# Patient Record
Sex: FEMALE | Race: WHITE | NOT HISPANIC OR LATINO
[De-identification: names, ages, dates, MRNs, and addresses within clinical notes are randomized per-mention and may not be internally consistent; named-entity substitution may affect disease eponyms.]

---

## 2019-07-08 PROBLEM — Z00.00 ENCOUNTER FOR PREVENTIVE HEALTH EXAMINATION: Status: ACTIVE | Noted: 2019-07-08

## 2019-08-07 ENCOUNTER — APPOINTMENT (OUTPATIENT)
Dept: OPHTHALMOLOGY | Facility: CLINIC | Age: 59
End: 2019-08-07
Payer: COMMERCIAL

## 2019-08-07 ENCOUNTER — NON-APPOINTMENT (OUTPATIENT)
Age: 59
End: 2019-08-07

## 2019-08-07 PROCEDURE — 92134 CPTRZ OPH DX IMG PST SGM RTA: CPT

## 2019-08-07 PROCEDURE — 92004 COMPRE OPH EXAM NEW PT 1/>: CPT

## 2020-08-31 ENCOUNTER — NON-APPOINTMENT (OUTPATIENT)
Age: 60
End: 2020-08-31

## 2020-08-31 ENCOUNTER — APPOINTMENT (OUTPATIENT)
Dept: OPHTHALMOLOGY | Facility: CLINIC | Age: 60
End: 2020-08-31
Payer: COMMERCIAL

## 2020-08-31 PROCEDURE — 92012 INTRM OPH EXAM EST PATIENT: CPT

## 2020-10-13 ENCOUNTER — RESULT REVIEW (OUTPATIENT)
Age: 60
End: 2020-10-13

## 2020-10-13 ENCOUNTER — APPOINTMENT (OUTPATIENT)
Dept: MAMMOGRAPHY | Facility: IMAGING CENTER | Age: 60
End: 2020-10-13
Payer: COMMERCIAL

## 2020-10-13 ENCOUNTER — OUTPATIENT (OUTPATIENT)
Dept: OUTPATIENT SERVICES | Facility: HOSPITAL | Age: 60
LOS: 1 days | End: 2020-10-13
Payer: COMMERCIAL

## 2020-10-13 DIAGNOSIS — R92.8 OTHER ABNORMAL AND INCONCLUSIVE FINDINGS ON DIAGNOSTIC IMAGING OF BREAST: ICD-10-CM

## 2020-10-13 PROCEDURE — 88305 TISSUE EXAM BY PATHOLOGIST: CPT

## 2020-10-13 PROCEDURE — 88305 TISSUE EXAM BY PATHOLOGIST: CPT | Mod: 26

## 2020-10-13 PROCEDURE — 88360 TUMOR IMMUNOHISTOCHEM/MANUAL: CPT | Mod: 26

## 2020-10-13 PROCEDURE — 19081 BX BREAST 1ST LESION STRTCTC: CPT | Mod: LT

## 2020-10-13 PROCEDURE — 88360 TUMOR IMMUNOHISTOCHEM/MANUAL: CPT

## 2020-10-13 PROCEDURE — A4648: CPT

## 2020-10-13 PROCEDURE — 19081 BX BREAST 1ST LESION STRTCTC: CPT

## 2020-10-13 PROCEDURE — 77065 DX MAMMO INCL CAD UNI: CPT

## 2020-10-13 PROCEDURE — 77065 DX MAMMO INCL CAD UNI: CPT | Mod: 26,LT

## 2020-10-17 ENCOUNTER — TRANSCRIPTION ENCOUNTER (OUTPATIENT)
Age: 60
End: 2020-10-17

## 2020-10-28 ENCOUNTER — APPOINTMENT (OUTPATIENT)
Dept: MRI IMAGING | Facility: CLINIC | Age: 60
End: 2020-10-28
Payer: COMMERCIAL

## 2020-10-28 ENCOUNTER — OUTPATIENT (OUTPATIENT)
Dept: OUTPATIENT SERVICES | Facility: HOSPITAL | Age: 60
LOS: 1 days | End: 2020-10-28
Payer: COMMERCIAL

## 2020-10-28 DIAGNOSIS — Z00.8 ENCOUNTER FOR OTHER GENERAL EXAMINATION: ICD-10-CM

## 2020-10-28 DIAGNOSIS — R92.0 MAMMOGRAPHIC MICROCALCIFICATION FOUND ON DIAGNOSTIC IMAGING OF BREAST: ICD-10-CM

## 2020-10-28 PROCEDURE — C8908: CPT

## 2020-10-28 PROCEDURE — A9585: CPT

## 2020-10-28 PROCEDURE — C8937: CPT

## 2020-10-28 PROCEDURE — 77049 MRI BREAST C-+ W/CAD BI: CPT | Mod: 26

## 2020-11-12 ENCOUNTER — RESULT REVIEW (OUTPATIENT)
Age: 60
End: 2020-11-12

## 2020-11-12 ENCOUNTER — APPOINTMENT (OUTPATIENT)
Dept: MRI IMAGING | Facility: IMAGING CENTER | Age: 60
End: 2020-11-12
Payer: COMMERCIAL

## 2020-11-12 ENCOUNTER — OUTPATIENT (OUTPATIENT)
Dept: OUTPATIENT SERVICES | Facility: HOSPITAL | Age: 60
LOS: 1 days | End: 2020-11-12
Payer: COMMERCIAL

## 2020-11-12 DIAGNOSIS — N63.0 UNSPECIFIED LUMP IN UNSPECIFIED BREAST: ICD-10-CM

## 2020-11-12 PROCEDURE — 19086 BX BREAST ADD LESION MR IMAG: CPT

## 2020-11-12 PROCEDURE — 19086 BX BREAST ADD LESION MR IMAG: CPT | Mod: RT

## 2020-11-12 PROCEDURE — A9585: CPT

## 2020-11-12 PROCEDURE — 19085 BX BREAST 1ST LESION MR IMAG: CPT | Mod: LT

## 2020-11-12 PROCEDURE — 88360 TUMOR IMMUNOHISTOCHEM/MANUAL: CPT | Mod: 26

## 2020-11-12 PROCEDURE — 88360 TUMOR IMMUNOHISTOCHEM/MANUAL: CPT

## 2020-11-12 PROCEDURE — 88305 TISSUE EXAM BY PATHOLOGIST: CPT | Mod: 26

## 2020-11-12 PROCEDURE — 77066 DX MAMMO INCL CAD BI: CPT

## 2020-11-12 PROCEDURE — 88305 TISSUE EXAM BY PATHOLOGIST: CPT

## 2020-11-12 PROCEDURE — 77066 DX MAMMO INCL CAD BI: CPT | Mod: 26

## 2020-11-12 PROCEDURE — 19085 BX BREAST 1ST LESION MR IMAG: CPT

## 2020-11-13 LAB — SURGICAL PATHOLOGY STUDY: SIGNIFICANT CHANGE UP

## 2022-06-23 ENCOUNTER — APPOINTMENT (OUTPATIENT)
Dept: OPHTHALMOLOGY | Facility: CLINIC | Age: 62
End: 2022-06-23

## 2022-06-23 ENCOUNTER — NON-APPOINTMENT (OUTPATIENT)
Age: 62
End: 2022-06-23

## 2022-06-23 PROCEDURE — 92014 COMPRE OPH EXAM EST PT 1/>: CPT

## 2022-06-23 PROCEDURE — 92134 CPTRZ OPH DX IMG PST SGM RTA: CPT

## 2022-12-12 ENCOUNTER — EMERGENCY (EMERGENCY)
Facility: HOSPITAL | Age: 62
LOS: 1 days | Discharge: HOME | End: 2022-12-12
Attending: STUDENT IN AN ORGANIZED HEALTH CARE EDUCATION/TRAINING PROGRAM | Admitting: EMERGENCY MEDICINE
Payer: COMMERCIAL

## 2022-12-12 VITALS
DIASTOLIC BLOOD PRESSURE: 53 MMHG | SYSTOLIC BLOOD PRESSURE: 105 MMHG | OXYGEN SATURATION: 99 % | TEMPERATURE: 98 F | RESPIRATION RATE: 18 BRPM | HEART RATE: 99 BPM

## 2022-12-12 DIAGNOSIS — Z90.13 ACQUIRED ABSENCE OF BILATERAL BREASTS AND NIPPLES: Chronic | ICD-10-CM

## 2022-12-12 LAB
ALBUMIN SERPL ELPH-MCNC: 3.7 G/DL — SIGNIFICANT CHANGE UP (ref 3.3–5)
ALP SERPL-CCNC: 73 U/L — SIGNIFICANT CHANGE UP (ref 40–120)
ALT FLD-CCNC: 5 U/L — SIGNIFICANT CHANGE UP (ref 4–33)
ANION GAP SERPL CALC-SCNC: 10 MMOL/L — SIGNIFICANT CHANGE UP (ref 7–14)
AST SERPL-CCNC: 7 U/L — SIGNIFICANT CHANGE UP (ref 4–32)
BASOPHILS # BLD AUTO: 0.03 K/UL — SIGNIFICANT CHANGE UP (ref 0–0.2)
BASOPHILS NFR BLD AUTO: 0.3 % — SIGNIFICANT CHANGE UP (ref 0–2)
BILIRUB SERPL-MCNC: 0.4 MG/DL — SIGNIFICANT CHANGE UP (ref 0.2–1.2)
BUN SERPL-MCNC: 14 MG/DL — SIGNIFICANT CHANGE UP (ref 7–23)
CALCIUM SERPL-MCNC: 9.7 MG/DL — SIGNIFICANT CHANGE UP (ref 8.4–10.5)
CHLORIDE SERPL-SCNC: 100 MMOL/L — SIGNIFICANT CHANGE UP (ref 98–107)
CO2 SERPL-SCNC: 29 MMOL/L — SIGNIFICANT CHANGE UP (ref 22–31)
CREAT SERPL-MCNC: 0.76 MG/DL — SIGNIFICANT CHANGE UP (ref 0.5–1.3)
EGFR: 88 ML/MIN/1.73M2 — SIGNIFICANT CHANGE UP
EOSINOPHIL # BLD AUTO: 0.01 K/UL — SIGNIFICANT CHANGE UP (ref 0–0.5)
EOSINOPHIL NFR BLD AUTO: 0.1 % — SIGNIFICANT CHANGE UP (ref 0–6)
FLUAV AG NPH QL: SIGNIFICANT CHANGE UP
FLUBV AG NPH QL: SIGNIFICANT CHANGE UP
GLUCOSE SERPL-MCNC: 117 MG/DL — HIGH (ref 70–99)
HCT VFR BLD CALC: 37.4 % — SIGNIFICANT CHANGE UP (ref 34.5–45)
HGB BLD-MCNC: 11.9 G/DL — SIGNIFICANT CHANGE UP (ref 11.5–15.5)
IANC: 7.18 K/UL — SIGNIFICANT CHANGE UP (ref 1.8–7.4)
IMM GRANULOCYTES NFR BLD AUTO: 0.3 % — SIGNIFICANT CHANGE UP (ref 0–0.9)
LYMPHOCYTES # BLD AUTO: 1.03 K/UL — SIGNIFICANT CHANGE UP (ref 1–3.3)
LYMPHOCYTES # BLD AUTO: 11.2 % — LOW (ref 13–44)
MAGNESIUM SERPL-MCNC: 2.2 MG/DL — SIGNIFICANT CHANGE UP (ref 1.6–2.6)
MCHC RBC-ENTMCNC: 27.7 PG — SIGNIFICANT CHANGE UP (ref 27–34)
MCHC RBC-ENTMCNC: 31.8 GM/DL — LOW (ref 32–36)
MCV RBC AUTO: 87 FL — SIGNIFICANT CHANGE UP (ref 80–100)
MONOCYTES # BLD AUTO: 0.93 K/UL — HIGH (ref 0–0.9)
MONOCYTES NFR BLD AUTO: 10.1 % — SIGNIFICANT CHANGE UP (ref 2–14)
NEUTROPHILS # BLD AUTO: 7.18 K/UL — SIGNIFICANT CHANGE UP (ref 1.8–7.4)
NEUTROPHILS NFR BLD AUTO: 78 % — HIGH (ref 43–77)
NRBC # BLD: 0 /100 WBCS — SIGNIFICANT CHANGE UP (ref 0–0)
NRBC # FLD: 0 K/UL — SIGNIFICANT CHANGE UP (ref 0–0)
PHOSPHATE SERPL-MCNC: 3.4 MG/DL — SIGNIFICANT CHANGE UP (ref 2.5–4.5)
PLATELET # BLD AUTO: 327 K/UL — SIGNIFICANT CHANGE UP (ref 150–400)
POTASSIUM SERPL-MCNC: 4.1 MMOL/L — SIGNIFICANT CHANGE UP (ref 3.5–5.3)
POTASSIUM SERPL-SCNC: 4.1 MMOL/L — SIGNIFICANT CHANGE UP (ref 3.5–5.3)
PROT SERPL-MCNC: 7.7 G/DL — SIGNIFICANT CHANGE UP (ref 6–8.3)
RBC # BLD: 4.3 M/UL — SIGNIFICANT CHANGE UP (ref 3.8–5.2)
RBC # FLD: 12.3 % — SIGNIFICANT CHANGE UP (ref 10.3–14.5)
RSV RNA NPH QL NAA+NON-PROBE: SIGNIFICANT CHANGE UP
SARS-COV-2 RNA SPEC QL NAA+PROBE: DETECTED
SODIUM SERPL-SCNC: 139 MMOL/L — SIGNIFICANT CHANGE UP (ref 135–145)
TROPONIN T, HIGH SENSITIVITY RESULT: <6 NG/L — SIGNIFICANT CHANGE UP
WBC # BLD: 9.21 K/UL — SIGNIFICANT CHANGE UP (ref 3.8–10.5)
WBC # FLD AUTO: 9.21 K/UL — SIGNIFICANT CHANGE UP (ref 3.8–10.5)

## 2022-12-12 PROCEDURE — 99220: CPT

## 2022-12-12 PROCEDURE — 93971 EXTREMITY STUDY: CPT | Mod: 26,LT

## 2022-12-12 PROCEDURE — 93010 ELECTROCARDIOGRAM REPORT: CPT

## 2022-12-12 PROCEDURE — 71046 X-RAY EXAM CHEST 2 VIEWS: CPT | Mod: 26

## 2022-12-12 RX ORDER — IBUPROFEN 200 MG
400 TABLET ORAL EVERY 6 HOURS
Refills: 0 | Status: DISCONTINUED | OUTPATIENT
Start: 2022-12-12 | End: 2022-12-15

## 2022-12-12 RX ORDER — IBUPROFEN 200 MG
600 TABLET ORAL EVERY 6 HOURS
Refills: 0 | Status: DISCONTINUED | OUTPATIENT
Start: 2022-12-12 | End: 2022-12-12

## 2022-12-12 RX ORDER — ATORVASTATIN CALCIUM 80 MG/1
40 TABLET, FILM COATED ORAL AT BEDTIME
Refills: 0 | Status: DISCONTINUED | OUTPATIENT
Start: 2022-12-12 | End: 2022-12-15

## 2022-12-12 RX ADMIN — ATORVASTATIN CALCIUM 40 MILLIGRAM(S): 80 TABLET, FILM COATED ORAL at 21:42

## 2022-12-12 RX ADMIN — Medication 400 MILLIGRAM(S): at 19:44

## 2022-12-12 NOTE — ED CDU PROVIDER INITIAL DAY NOTE - CLINICAL SUMMARY MEDICAL DECISION MAKING FREE TEXT BOX
63 yo F with syncope x 2 today. abnormal EKG   labs wnl. LE DVT study negative.   patient placed in CDU for cardiac monitoring, echo and TDD eval tomorrow.    knee pain likely arthritis, unclear. no DVT. no clinical concern for fracture/ dislocation/ or infection. will provide outpatient ortho f/u    Man DUNN: Please see initial ED provider note for further details.  Briefly, plan for echo, cards eval, monitoring, clinical reassessment.

## 2022-12-12 NOTE — ED PROVIDER NOTE - OBJECTIVE STATEMENT
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 Denies fever, chills, chest pain, shortness of breath, difficulty breathing, abdominal pain, N/V/D, urinary symptoms, extremity weakness/numbness/tingling, dizziness, vertigo, blurry vision/change in vision, or headaches. 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 Patient reporting 2 episodes of syncope this morning, reports lightheadedness prior to syncope, no vertigo/dizziness, chest pain/shortness of breath reported prior to syncope. Pt also report. Pt additionally complaining of right-sided Denies fever, chills, chest pain, shortness of breath, difficulty breathing, abdominal pain, N/V/D, urinary symptoms, extremity weakness/numbness/tingling, dizziness, vertigo, blurry vision/change in vision, or headaches. 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 Patient reporting 2 episodes of syncope this morning, reports lightheadedness prior to syncope, no vertigo/dizziness, chest pain/shortness of breath reported prior to syncope. No personal or family history of CAD, CHF, Heart Disease. Pt also reporting right-sided popliteal pain x 1 month, describes the pain as a knee tightening, worse at night, knee pain is relieved with Motrin. Of note, patient was COVID positive on 11/30, treated with Paxlovid. Received flu vaccine. No known ill contacts, no recent travel. Pt has PMD, does not follow Cardiologist. Denies fever, chills, chest pain, dyspnea on exertion, shortness of breath, difficulty breathing, abdominal pain, N/V/D, urinary symptoms, extremity weakness/numbness/tingling, dizziness, vertigo, blurry vision/change in vision, or headaches.

## 2022-12-12 NOTE — ED CDU PROVIDER INITIAL DAY NOTE - PHYSICAL EXAMINATION
Vital signs reviewed.   CONSTITUTIONAL: Well-appearing; well-nourished; in no apparent distress. Non-toxic appearing.   HEAD: Normocephalic, atraumatic.  EYES: PERRL, EOM intact, conjunctiva and sclera WNL.  CARD: Normal S1, S2; no murmurs, rubs, or gallops noted.  RESP: Normal chest excursion with respiration; breath sounds clear and equal bilaterally; no wheezes, rhonchi, or rales.  ABD/GI: soft, non-distended; non-tender; no palpable organomegaly, no pulsatile mass.  EXT/MS: moves all extremities; distal pulses are normal, no pedal edema. RLE: FROM, no effusions, deformity, ligamental laxity or TTP.   SKIN: Normal for age and race; warm; dry; good turgor; no apparent lesions or exudate noted.  NEURO: Awake, alert, oriented x 3, no gross deficits, CN II-XII grossly intact, no motor or sensory deficit noted.

## 2022-12-12 NOTE — ED PROVIDER NOTE - CLINICAL SUMMARY MEDICAL DECISION MAKING FREE TEXT BOX
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 Patient afebrile, hemodynamically stable, spO2 98%. Based on history and physical, differentials include but are not limited to ACS, CHF, Arrhythmia, electrolyte abnormality. Plan to assess patient for acute pathology with Labs, EKG.    Additionally, complaining of right-sided popliteal knee pain x 1 month. Based on history and physical, differentials include but are not limited to Baker's Cyst, DVT. Plan to assess patient for acute pathology with LE Venous Doppler. 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 Patient afebrile, hemodynamically stable, spO2 98%. Based on history and physical, differentials include but are not limited to ACS, CHF, Arrhythmia, electrolyte abnormality. Plan to assess patient for acute pathology with Labs, EKG, CXR. Patient will most likely go to CDU for cardiology workup (ECHO).    Additionally, complaining of right-sided popliteal knee pain x 1 month. Based on history and physical, differentials include but are not limited to Baker's Cyst, DVT. Plan to assess patient for acute pathology with LE Venous Doppler.

## 2022-12-12 NOTE — ED CDU PROVIDER INITIAL DAY NOTE - OBJECTIVE STATEMENT
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 Patient reporting 2 episodes of syncope this morning, reports lightheadedness prior to syncope, no vertigo/dizziness, chest pain/shortness of breath reported prior to syncope. No personal or family history of CAD, CHF, Heart Disease. Pt also reporting right-sided popliteal pain x 1 month, describes the pain as a knee tightening, worse at night, knee pain is relieved with Motrin. Of note, patient was COVID positive on 11/30, treated with Paxlovid. Received flu vaccine. No known ill contacts, no recent travel. Pt has PMD, does not follow Cardiologist. Denies fever, chills, chest pain, dyspnea on exertion, shortness of breath, difficulty breathing, abdominal pain, N/V/D, urinary symptoms, extremity weakness/numbness/tingling, dizziness, vertigo, blurry vision/change in vision, or headaches.    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 Patient reporting 2 episodes of syncope this morning, reports lightheadedness prior to syncope, no vertigo/dizziness, chest pain/shortness of breath reported prior to syncope. No personal or family history of CAD, CHF, Heart Disease. Pt also reporting right-sided popliteal pain x 1 month, describes the pain as a knee tightening, worse at night, knee pain is relieved with Motrin. Of note, patient was COVID positive on 11/30, treated with Paxlovid. Received flu vaccine. No known ill contacts, no recent travel. Pt has PMD, does not follow Cardiologist. Denies fever, chills, chest pain, dyspnea on exertion, shortness of breath, difficulty breathing, abdominal pain, N/V/D, urinary symptoms, extremity weakness/numbness/tingling, dizziness, vertigo, blurry vision/change in vision, or headaches.    CDU COSTA Ann: Agree with above note. 63 yo F non smoker with Past medical history breast cancer ( s/p mastectomy in June 2021 and reconstruction July 2021) and hyperlipidemia here with complaint of lightheadedness and syncope x2 this morning.  Patient with recent COVID infection 11/30 treated with Paxlovid.  Reports no URI symptoms at this time denies congestion, cough, chest pain, shortness of breath, palpitations, fevers, chills, abdominal pain, nausea, vomiting, urinary symptoms, history of VTE, history of CAD, weakness, numbness, tingling, vertigo, headaches.  Patient reports for 1 month she has had right posterior knee pain atraumatic this occurred after receiving the flu vaccination patient unclear if it is related.  Patient reports she is fine when she is up and moving during the day but worse in the morning after waking up the knee feels stiff.  And improves with ibuprofen.  In the ED labs grossly within normal limits troponin less than 6, DVT study of the right lower extremity negative.  Chest x-ray clear lungs.  Patient placed in CDU for cardiac monitoring, echo, telemetry doc of the day evaluation.  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

## 2022-12-12 NOTE — ED PROVIDER NOTE - PHYSICAL EXAMINATION
MSK Lower Extremity: Right-sided popliteal swelling present when compared to the left side.   Bilateral LE extremities with full active/passive ROM, sensation intact, 2+ distal lower pulses, calves are non-tender to palpation, no erythema overlying the skin MSK Lower Extremity: Right-sided popliteal swelling present when compared to the left side.   Bilateral LE extremities with full active/passive ROM, full strength, sensation intact, 2+ distal lower pulses, calves are non-tender to palpation/no palpable cords, no erythema overlying the skin

## 2022-12-12 NOTE — ED PROVIDER NOTE - NS ED ATTENDING STATEMENT MOD
This was a shared visit with the ARA. I reviewed and verified the documentation and independently performed the documented:

## 2022-12-12 NOTE — ED PROVIDER NOTE - PROGRESS NOTE DETAILS
COSTA Gundersno: Reviewed patient lab and imaging results - labs within normal limits, CXR clear, US LE Right with no DVT. Patient accepted by CDU COSTA Ann for Cardiology workup due to new onset RBBB with no established Cardiology care (ECHO).

## 2022-12-12 NOTE — ED ADULT NURSE NOTE - CAS TRG GENERAL AIRWAY, MLM
Patient arrived to ED today with c/o episode of coughing up blood this morning x1.  Patient reports that she has not had this happen before.
Patent

## 2022-12-12 NOTE — ED PROVIDER NOTE - ATTENDING APP SHARED VISIT CONTRIBUTION OF CARE
I have personally performed a face to face medical and diagnostic evaluation of the patient. I have discussed with and reviewed the ARA's note and agree with the History, ROS, Physical Exam and MDM unless otherwise indicated. A brief summary of my personal evaluation and impression can be found below.    Man DUNN: 62-year-old female with a history of DCIS who presented with a chief complaint of syncope x2 earlier this morning.  Patient reports last night had a drink of alcohol thereafter when upon waking this morning had episode of dizziness for which she describes as feeling lightheaded dizzy felt as if things were spinning and passed out second episode occurred in bathroom.  No chest pain no shortness of breath no nausea no vomiting related episodes.  Patient notes since her Flu vaccine that she has been having intermittent body aches as well as a right posterior knee pain that is been there prior to the episode of syncope today.  Patient has never seen a cardiologist.  Patient reports per her primary care doctor her EKG is otherwise normal.  Had an echo about 3 years ago that was normal according to patient.  No prior history of DVT or PE.  No fever. No urinary or bowel complaints.    All other ROS negative, except as above and as per HPI and ROS section.    VITALS: Initial triage and subsequent vitals have been reviewed by me.  GEN APPEARANCE: WDWN, alert, non-toxic, NAD  HEAD: Atraumatic.  EYES: PERRLa, EOMI, vision grossly intact.   NECK: Supple  CV: RRR, S1S2, no c/r/m/g. Cap refill <2 seconds. No bruits.   LUNGS: CTAB. No abnormal breath sounds.  ABDOMEN: Soft, NTND. No guarding or rebound.   MSK/EXT: mild swelling post aspect R knee, No spinal or extremity point tenderness. No CVA ttp. Pelvis stable. No peripheral edema.  NEURO: Alert, follows commands. Weight bearing normal. Speech normal. Sensation and motor normal x4 extremities.   SKIN: Warm, dry and intact. No rash.  PSYCH: Appropriate    Plan/MDM:   Man DUNN: 62-year-old female with a history of DCIS who presented with a chief complaint of syncope x2 earlier this morning.  Patient reports last night had a drink of alcohol thereafter when upon waking this morning had episode of dizziness for which she describes as feeling lightheaded dizzy felt as if things were spinning and passed out second episode occurred in bathroom.  No chest pain no shortness of breath no nausea no vomiting related episodes.  Patient notes since her Flu vaccine that she has been having intermittent body aches as well as a right posterior knee pain that is been there prior to the episode of syncope today.   Exam vital signs stable nontoxic-appearing physical is grossly reassuring.  Suspect episodes of syncope likely vasovagal however, EKG with what appears to be a new right bundle branch block.  Given EKG changes with possible syncope today will check labs chest x-ray cardiac DVT study to right lower extremity for possible Baker's cyst less concern for DVT.  Patient agreeable to CDU stay for echo and cardiac evaluation.

## 2022-12-12 NOTE — ED CDU PROVIDER INITIAL DAY NOTE - MEDICAL DECISION MAKING DETAILS
61 yo F with syncope x 2 today.   labs wnl. LE DVT study negative.   patient placed in CDU for cardiac monitoring, echo and TDD eval tomorrow. 63 yo F with syncope x 2 today. abnormal EKG   labs wnl. LE DVT study negative.   patient placed in CDU for cardiac monitoring, echo and TDD eval tomorrow.    knee pain likely arthritis, unclear. no DVT. no clinical concern for fracture/ dislocation/ or infection. will provide outpatient ortho f/u

## 2022-12-12 NOTE — ED CDU PROVIDER INITIAL DAY NOTE - ATTENDING APP SHARED VISIT CONTRIBUTION OF CARE
I have personally performed a face to face medical and diagnostic evaluation of the patient. I have discussed with and reviewed the ARA's note and agree with the History, ROS, Physical Exam and MDM unless otherwise indicated. A brief summary of my personal evaluation and impression can be found below.    Please see initial ED provider note for further details.

## 2022-12-12 NOTE — ED ADULT NURSE REASSESSMENT NOTE - NS ED NURSE REASSESS COMMENT FT1
Pt remains on the cardiac monitor, is ambulatory to the bathroom, provided a dinner tray. Respirations are even and unlabored, chest rise equal b/l. Denies dizziness at presently. COSTA Smith paged for PRN Motrin, pt says she has been taking it for knee pain at night. Safety being maintained. Will continue to monitor.

## 2022-12-12 NOTE — ED ADULT NURSE NOTE - CHIEF COMPLAINT QUOTE
Patient had two syncopal episodes this morning and behind the right knee stiffness.. (Pt had flu vaccine 11/14 and Covid 11/30)

## 2022-12-12 NOTE — ED ADULT NURSE NOTE - OBJECTIVE STATEMENT
Patient is a 62-year-old female, A&OX3, Phx of high cholesterol, coming in c/o X 2 syncopal episodes. Also reporting pain to back of right knee X few days. Denies Hx of blood clots. Some swelling noted to behind the area. Pt EKG done, and showing R bundle branch block, pt denies any known cardiac Hx. Per pt , witnessed syncopal episode. Says was down for 20 seconds. Was sitting on bed, did not hit head. No AC use. Breathing even and unlabored, chest rise equal b/l. Safety maintained. Will continue to monitor.

## 2022-12-13 VITALS
DIASTOLIC BLOOD PRESSURE: 72 MMHG | SYSTOLIC BLOOD PRESSURE: 104 MMHG | RESPIRATION RATE: 18 BRPM | HEART RATE: 79 BPM | OXYGEN SATURATION: 99 %

## 2022-12-13 PROCEDURE — 99217: CPT

## 2022-12-13 PROCEDURE — 71275 CT ANGIOGRAPHY CHEST: CPT | Mod: 26,MA

## 2022-12-13 PROCEDURE — 93306 TTE W/DOPPLER COMPLETE: CPT | Mod: 26

## 2022-12-13 PROCEDURE — 99284 EMERGENCY DEPT VISIT MOD MDM: CPT

## 2022-12-13 RX ADMIN — Medication 400 MILLIGRAM(S): at 10:11

## 2022-12-13 NOTE — ED CDU PROVIDER SUBSEQUENT DAY NOTE - MEDICAL DECISION MAKING DETAILS
63 yo F with syncope x 2 today. abnormal EKG   labs wnl. LE DVT study negative.   patient placed in CDU for cardiac monitoring, echo and TDD eval tomorrow.

## 2022-12-13 NOTE — ED CDU PROVIDER DISPOSITION NOTE - NSFOLLOWUPINSTRUCTIONS_ED_ALL_ED_FT
Rest, drink plenty of fluids.  Advance activity as tolerated.  Continue all previously prescribed medications as directed.  Follow up with your primary care physician and cardiologist in 48-72 hours- bring copies of your results. Call on referral list given for next available appointment. Return to the ER for worsening or persistent symptoms, and/or ANY NEW OR CONCERNING SYMPTOMS. If you have issues obtaining follow up, please call: [phone] to obtain a doctor or specialist who takes your insurance in your area.

## 2022-12-13 NOTE — CONSULT NOTE ADULT - SUBJECTIVE AND OBJECTIVE BOX
Cardiology/Vascular Medicine Inpatient Consultation Note    HISTORY OF PRESENT ILLNESS:    Patient is a 61 yo woman who has a history of breast cancer (s/p bilateral mastectomy in June 2021 and reconstruction July 2021) and hyperlipidemia.  She presents to [facility] with complaints of lightheadedness and syncope x 2 events.  She has not had these symptoms previously.     12-lead ECG and bedside telemetry --> SR, RBBB, ? ST depressions; otherwise no additional events noted  Cardiac biomarkers flat.  COVID PCR positive  CXR unremarkable for acute findings.  RLE LE venous ultrasound did not note DVT or a Baker's cyst.  CTA chest - no PE    At the time of my evaluation, the patient appeared clinically and hemodynamically stable.  Unremarkable physical exam.  She appeared euvolemic on my exam.    Patient already scheduled for an echocardiogram by the CDU team.    Anticipate with an unremarkable echocardiogram that the patient can be discharged to home without the need for additional inpatient cardiac testing.  If testing is unremarkable, she can f/u with me within 1-2 weeks for further evaluation as needed.    Allergies  No Known Allergies    MEDICATIONS:  ibuprofen  Tablet. 400 milliGRAM(s) Oral every 6 hours PRN  atorvastatin 40 milliGRAM(s) Oral at bedtime    PAST MEDICAL & SURGICAL HISTORY:  Ductal carcinoma in situ (DCIS) of breast  H/O bilateral mastectomy    FAMILY HISTORY:  No pertinent family history in first degree relatives    SOCIAL HISTORY:    As above, as per chart notes    REVIEW OF SYSTEMS:  As above, as per chart notes    PHYSICAL EXAM:  T(C): 36.8 (12-13-22 @ 07:02), Max: 37.4 (12-12-22 @ 20:17)  HR: 79 (12-13-22 @ 11:04) (71 - 104)  BP: 104/72 (12-13-22 @ 11:04) (100/72 - 132/70)  RR: 18 (12-13-22 @ 11:04) (14 - 18)  SpO2: 99% (12-13-22 @ 11:04) (98% - 100%)    Appearance: NAD  HEENT:   No JVD  Cardiovascular: Normal S1 S2  Respiratory: Lungs clear to auscultation	  Psychiatry: Awake, alert  Neurologic: Non-focal  Extremities: No edema      LABS:	 	    CBC Full  -  ( 12 Dec 2022 12:12 )  WBC Count : 9.21 K/uL  Hemoglobin : 11.9 g/dL  Hematocrit : 37.4 %  Platelet Count - Automated : 327 K/uL  Mean Cell Volume : 87.0 fL  Mean Cell Hemoglobin : 27.7 pg  Mean Cell Hemoglobin Concentration : 31.8 gm/dL  Auto Neutrophil # : 7.18 K/uL  Auto Lymphocyte # : 1.03 K/uL  Auto Monocyte # : 0.93 K/uL  Auto Eosinophil # : 0.01 K/uL  Auto Basophil # : 0.03 K/uL  Auto Neutrophil % : 78.0 %  Auto Lymphocyte % : 11.2 %  Auto Monocyte % : 10.1 %  Auto Eosinophil % : 0.1 %  Auto Basophil % : 0.3 %    12-12    139  |  100  |  14  ----------------------------<  117<H>  4.1   |  29  |  0.76    Ca    9.7      12 Dec 2022 12:12  Phos  3.4     12-12  Mg     2.20     12-12    TPro  7.7  /  Alb  3.7  /  TBili  0.4  /  DBili  x   /  AST  7   /  ALT  5   /  AlkPhos  73  12-12    < from: US Duplex Venous Lower Ext Ltd, Right (12.12.22 @ 13:09) >    ACC: [id number] EXAM:  US DPLX LWR EXT VEINS LTD RT                          PROCEDURE DATE:  12/12/2022          INTERPRETATION:  CLINICAL INFORMATION: Lower extremity pain localized to   the popliteal fossa    COMPARISON: None available.    TECHNIQUE: Duplex sonography of the RIGHT LOWER extremity veins with   color and spectral Doppler, with and without compression.    FINDINGS:    There is normal compressibility of the right common femoral, femoral and   popliteal veins.  The contralateral common femoral vein is patent.  Doppler examination shows normal spontaneous and phasic flow.    No calf vein thrombosis is detected.    Ultrasound was directed to the right popliteal region. No Baker's cyst is   identified.    IMPRESSION:  No evidence of right lower extremity deep venous thrombosis.    No right Loza's cyst identified.            --- End of Report ---            ARUN CHOE MD; Attending Radiologist  This document has been electronically signed. Dec 12 2022  1:27PM    < end of copied text >  < from: Xray Chest 2 Views PA/Lat (12.12.22 @ 13:52) >    ACC: [id number] EXAM:  XR CHEST PA LAT 2V                          PROCEDURE DATE:  12/12/2022          INTERPRETATION:  EXAMINATION: XR CHEST PA AND LATERAL    CLINICAL INDICATION: syncope    TECHNIQUE: 2 views; Frontal and lateral views of the chest were obtained.    COMPARISON: None.    FINDINGS:  The cardiomediastinal silhouette is normal in size.  The lungs are clear.  There is no pneumothorax or pleural effusion.  No acute bony abnormality. Mediastinal, bilateral midlung field, left   axillary surgical clips.    IMPRESSION:  Clear lungs.    --- End of Report ---          KAN SANCHEZ MD; Resident Radiologist  This document has been electronically signed.  KAN CRUZ MD; Attending Radiologist  This document has been electronically signed. Dec 12 2022  2:51PM    < end of copied text >  < from: CT Angio Chest PE Protocol w/ IV Cont (12.13.22 @ 10:25) >    ACC: [id number] EXAM:  CT ANGIO CHEST PULM [facility]                          PROCEDURE DATE:  12/13/2022          INTERPRETATION:  CLINICAL INFORMATION: Syncope with Covid infection,   evaluate for pulmonary embolism.    COMPARISON: Chest [id number]/12/2022    CONTRAST/COMPLICATIONS:  IV Contrast: Omnipaque 350  80 cc administered   20 cc discarded  Oral Contrast: NONE  Complications: None reported at time of study completion    PROCEDURE:  CT Angiogram of the chest was obtained with intravenous contrast. Three   dimensional maximum intensity projection (MIP) images were generated.    FINDINGS:    PULMONARY ANGIOGRAM: No pulmonary embolism.    LYMPH NODES: No mediastinal or hilar lymphadenopathy.    HEART/VASCULATURE: The heart is normal in size. No pericardial effusion.   Aortic mild intimal calcifications. The ascending aorta is normal in   caliber.    AIRWAYS/LUNGS/PLEURA: Patent central airways. Left upper lobe calcified 3   mm granuloma. No consolidations, pleural effusion or pneumothorax.    UPPER ABDOMEN: Tiny hiatal hernia.    BONES/SOFT TISSUES: Bilateral breast implants. No aggressive appearing   osseous lesions.    IMPRESSION:    No pulmonary embolism.    --- End of Report ---          ARCHANA CLEVELAND DO; Resident Radiologist  This document has been electronically signed.  PRABHJOT RAYGOZA MD; Attending Radiologist  This document has been electronically signed. Dec 13 2022 12:15PM    < end of copied text >

## 2022-12-13 NOTE — ED CDU PROVIDER SUBSEQUENT DAY NOTE - CLINICAL SUMMARY MEDICAL DECISION MAKING FREE TEXT BOX
61 yo F with syncope x 2 today. abnormal EKG   labs wnl. LE DVT study negative.   patient placed in CDU for cardiac monitoring, echo and TDD eval tomorrow.

## 2022-12-13 NOTE — ED CDU PROVIDER SUBSEQUENT DAY NOTE - HISTORY
61 yo F non smoker with Past medical history breast cancer ( s/p mastectomy in June 2021 and reconstruction July 2021) and hyperlipidemia here with complaint of lightheadedness and syncope x2   In the ED labs grossly within normal limits troponin less than 6, DVT study of the right lower extremity negative.  Chest x-ray clear lungs.  Patient placed in CDU for cardiac monitoring, echo, telemetry doc of the day evaluation.  EKG showing right bundle katiuska block, slight ST depression.

## 2022-12-13 NOTE — ED CDU PROVIDER DISPOSITION NOTE - CLINICAL COURSE
61 yo F with syncope x 2 today. abnormal EKG labs wnl. Lower extremity DVT study negative acute finding.  patient placed in CDU for cardiac monitoring, echo and TDD eval tomorrow. Patient reassessed this morning, sitting comfortably in chair in NAD, denies any complaints. States feeling better, symptoms improved. Pt seen by tele doc, recommending CTA chest to r/o PE. CTA chest negative PE. Echo with mild mitral regurgitation. Discussed with tele doc Dr. Abrams, recommending outpatient follow up. Pt is stable for discharge.

## 2022-12-13 NOTE — CONSULT NOTE ADULT - ASSESSMENT
Patient is a 63 yo woman who has a history of breast cancer (s/p bilateral mastectomy in June 2021 and reconstruction July 2021) and hyperlipidemia.  She presents to [facility] with complaints of lightheadedness and syncope x 2 events.  She has not had these symptoms previously.     12-lead ECG and bedside telemetry --> SR, RBBB, ? ST depressions; otherwise no additional events noted  Cardiac biomarkers flat.  COVID PCR positive  CXR unremarkable for acute findings.  RLE LE venous ultrasound did not note DVT or a Baker's cyst.  CTA chest - no PE    At the time of my evaluation, the patient appeared clinically and hemodynamically stable.  Unremarkable physical exam.  She appeared euvolemic on my exam.    Patient already scheduled for an echocardiogram by the CDU team.    Anticipate with an unremarkable echocardiogram that the patient can be discharged to home without the need for additional inpatient cardiac testing.  If testing is unremarkable, she can f/u with me within 1-2 weeks for further evaluation as needed.

## 2022-12-13 NOTE — ED CDU PROVIDER DISPOSITION NOTE - PATIENT PORTAL LINK FT
You can access the FollowMyHealth Patient Portal offered by [facility] by registering at the following website: [email or web address]. By joining [facility]’s FollowMyHealth portal, you will also be able to view your health information using other applications (apps) compatible with our system.

## 2022-12-13 NOTE — ED CDU PROVIDER DISPOSITION NOTE - CARE PROVIDER_API CALL
Jose Abrams (MD; PhD)  Cardiology; Internal Medicine; Vascular Medicine  [id number] [place] [address], [address]  [place] [address]  Phone: [phone]  Fax: [phone]  Follow Up Time: Urgent

## 2022-12-13 NOTE — ED CDU PROVIDER SUBSEQUENT DAY NOTE - PROGRESS NOTE DETAILS
spoke with echo lab, states pt has been prioritized for echo. Patient reassessed, sitting comfortably in chair in NAD, denies any complaints. States feeling better, symptoms improved. spoke with tele doc, Dr. Abrams, recommending CTA chest to r/o PE. Will continue to monitor and reassess. CTA chest negative for PE. Spoke with Echo lab, will send transport for patient. Will continue to monitor and reassess. echo reviewed. spoke with Dr. Abrams, can follow up outpatient. Discussed with attending, patient can be discharged home to f/u with PCP and cardiology. The patient was given verbal and written discharge instructions. Specifically, instructions when to return to the ED and when to seek follow-up from their pcp was discussed. Any specialty follow-up was discussed, including how to make an appointment.  Instructions were discussed in simple, plain language and was understood by the patient. The patient understands that should their symptoms worsen or any new symptoms arise, they should return to the ED immediately for further evaluation. All pt's questions were answered. Patient verbalizes understanding.

## 2022-12-13 NOTE — ED CDU PROVIDER DISPOSITION NOTE - ATTENDING CONTRIBUTION TO CARE
no
I performed a history and physical exam of the patient and discussed their management with the advanced care provider. I reviewed the advanced care provider's note and agree with the documented findings and plan of care. My medical decision making and objective findings are found above.

## 2023-06-06 PROBLEM — D05.10 INTRADUCTAL CARCINOMA IN SITU OF UNSPECIFIED BREAST: Chronic | Status: ACTIVE | Noted: 2022-12-12

## 2023-07-20 ENCOUNTER — NON-APPOINTMENT (OUTPATIENT)
Age: 63
End: 2023-07-20

## 2023-07-20 ENCOUNTER — APPOINTMENT (OUTPATIENT)
Dept: OPHTHALMOLOGY | Facility: CLINIC | Age: 63
End: 2023-07-20
Payer: COMMERCIAL

## 2023-07-20 PROCEDURE — 92250 FUNDUS PHOTOGRAPHY W/I&R: CPT

## 2023-07-20 PROCEDURE — 92014 COMPRE OPH EXAM EST PT 1/>: CPT

## 2023-07-24 ENCOUNTER — APPOINTMENT (OUTPATIENT)
Dept: OBGYN | Facility: CLINIC | Age: 63
End: 2023-07-24
Payer: COMMERCIAL

## 2023-07-24 PROCEDURE — 99396 PREV VISIT EST AGE 40-64: CPT

## 2024-01-18 ENCOUNTER — OFFICE VISIT (OUTPATIENT)
Dept: URBAN - METROPOLITAN AREA CLINIC 35 | Facility: CLINIC | Age: 64
End: 2024-01-18
Payer: COMMERCIAL

## 2024-01-18 DIAGNOSIS — H43.812 VITREOUS DEGENERATION, LEFT EYE: ICD-10-CM

## 2024-01-18 DIAGNOSIS — H35.372 PUCKERING OF MACULA, LEFT EYE: Primary | ICD-10-CM

## 2024-01-18 DIAGNOSIS — H33.312 HORSESHOE TEAR OF RETINA WITHOUT DETACHMENT, LEFT EYE: ICD-10-CM

## 2024-01-18 PROCEDURE — 92201 OPSCPY EXTND RTA DRAW UNI/BI: CPT | Performed by: OPHTHALMOLOGY

## 2024-01-18 PROCEDURE — 92134 CPTRZ OPH DX IMG PST SGM RTA: CPT | Performed by: OPHTHALMOLOGY

## 2024-01-18 PROCEDURE — 99204 OFFICE O/P NEW MOD 45 MIN: CPT | Performed by: OPHTHALMOLOGY

## 2024-01-18 ASSESSMENT — INTRAOCULAR PRESSURE
OS: 9
OD: 9

## 2025-03-17 NOTE — ED CDU PROVIDER SUBSEQUENT DAY NOTE - MUSCULOSKELETAL [-], MLM
Photo Preface (Leave Blank If You Do Not Want): Photographs were obtained today
Detail Level: Zone
no back pain/no joint pain/no neck pain/no calf pain/no limited range of motion